# Patient Record
Sex: MALE | Race: WHITE | NOT HISPANIC OR LATINO | URBAN - METROPOLITAN AREA
[De-identification: names, ages, dates, MRNs, and addresses within clinical notes are randomized per-mention and may not be internally consistent; named-entity substitution may affect disease eponyms.]

---

## 2017-08-22 ENCOUNTER — OFFICE VISIT - HEALTHEAST (OUTPATIENT)
Dept: INTERNAL MEDICINE | Facility: CLINIC | Age: 62
End: 2017-08-22

## 2017-08-22 DIAGNOSIS — Z87.891 HISTORY OF SMOKING: ICD-10-CM

## 2017-08-22 DIAGNOSIS — Z00.00 ANNUAL PHYSICAL EXAM: ICD-10-CM

## 2017-08-22 LAB
CHOLEST SERPL-MCNC: 205 MG/DL
FASTING STATUS PATIENT QL REPORTED: ABNORMAL
HDLC SERPL-MCNC: 33 MG/DL
LDLC SERPL CALC-MCNC: 117 MG/DL
TRIGL SERPL-MCNC: 274 MG/DL

## 2017-08-22 ASSESSMENT — MIFFLIN-ST. JEOR: SCORE: 1564.3

## 2017-08-23 ENCOUNTER — COMMUNICATION - HEALTHEAST (OUTPATIENT)
Dept: INTERNAL MEDICINE | Facility: CLINIC | Age: 62
End: 2017-08-23

## 2021-05-29 ENCOUNTER — RECORDS - HEALTHEAST (OUTPATIENT)
Dept: ADMINISTRATIVE | Facility: CLINIC | Age: 66
End: 2021-05-29

## 2021-05-31 VITALS — WEIGHT: 174.8 LBS | BODY MASS INDEX: 25.89 KG/M2 | HEIGHT: 69 IN

## 2021-06-12 NOTE — PROGRESS NOTES
Subjective:     Kalpehs Dejesus is a 61 y.o. male who presents for an annual exam. No specific concerns today, just to check labs.     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 Drinks etoh on occasion 1-2 beers. Does not use illicit drugs     He has no family history of cancer    The patient reports that there is not domestic violence in his life.    Healthy Habits:   Regular Exercise: walking   Sunscreen Use: Yes  Healthy Diet: does eat a lot of red meat, encouraged to cut down, eat more veggies  Dental Visits Regularly: Yes  Sexually active: Yes  Colonoscopy: Yes, 2014, it was recommended he have repeat in 3 years however patient does not wish to do another colonoscopy right now     Immunization History   Administered Date(s) Administered     Tdap 07/17/2014     ZOSTER 08/22/2017     Immunization status: shingles vaccine today, otherwise UTD    Current Outpatient Prescriptions   Medication Sig Dispense Refill     cholecalciferol, vitamin D3, 1,000 unit tablet Take 1,000 Units by mouth daily. OTC       No current facility-administered medications for this visit.      No past medical history on file.  No past surgical history on file.  Review of patient's allergies indicates no known allergies.  No family history on file.  Social History     Social History     Marital status: Single     Spouse name: N/A     Number of children: N/A     Years of education: N/A     Occupational History     Not on file.     Social History Main Topics     Smoking status: Current Every Day Smoker     Types: Cigarettes     Smokeless tobacco: Not on file     Alcohol use Not on file     Drug use: Not on file     Sexual activity: Not on file     Other Topics Concern     Not on file     Social History Narrative       Review of Systems  General:  Negative  Eyes: Negative  Ears/Nose/Throat: Negative   Cardiovascular: Negative  Respiratory:   "Negative   Gastrointestinal:  Negative  Genitourinary: Negative   Musculoskeletal:  Negative  Skin: Negative   Neurologic: Negative   Psychiatric: Negative   Endocrine: Negative   Heme/Lymphatic:Negative    Allergic/Immunologic: Negative       Objective:     /74 (Patient Site: Left Arm, Patient Position: Sitting, Cuff Size: Adult Regular)  Pulse 64  Ht 5' 8.75\" (1.746 m)  Wt 174 lb 12.8 oz (79.3 kg)  SpO2 96%  BMI 26 kg/m2    Physical  General Appearance: Alert, cooperative, no distress, appears stated age  Head: Normocephalic, without obvious abnormality, atraumatic  Eyes: PERRL, conjunctiva/corneas clear, EOM's intact  Ears: Normal TM's and external ear canals, both ears  Nose: Nares normal, septum midline,mucosa normal, no drainage  Throat: Lips, mucosa, and tongue normal. Missing teeth, fillings and dental caries   Neck: Supple, symmetrical, trachea midline, no adenopathy;  thyroid: not enlarged, symmetric, no tenderness/mass/nodules  Back: Symmetric, no curvature, ROM normal, no CVA tenderness  Lungs: Clear to auscultation bilaterally, respirations unlabored  Heart: Regular rate and rhythm, S1 and S2 normal, no murmur, rub, or gallop,  Abdomen: Soft, non-tender, bowel sounds active all four quadrants,  no masses, no organomegaly  Genitourinary: deferred  Prostate: declined exam   Musculoskeletal: Normal range of motion. No joint swelling or deformity   Extremities: Extremities normal, atraumatic, no cyanosis or edema  Skin: Skin color, texture, turgor normal, no rashes or lesions  Lymph nodes: Cervical, supraclavicular, and axillary nodes normal  Neurologic: He is alert. He has normal reflexes.   Psychiatric: He has a normal mood and affect.     Assessment/Plan:     Healthy male exam.    1. Annual physical exam  Varicella-zoster vaccine subq   Repeat labs, recheck TSH, lipid, bmp. Varicella vaccine today  Lipid [place]    Basic Metabolic Panel    Thyroid Stimulating Hormone (TSH)   2. History of " smoking - no interest in quitting, but encouraged he try to at least cut down. Spent 3-5 minutes on smoking cessation counseling       I recommended he eat a healthy diet and get regular exercise.       Geoffrey Vigil MD  [facility]